# Patient Record
Sex: MALE
[De-identification: names, ages, dates, MRNs, and addresses within clinical notes are randomized per-mention and may not be internally consistent; named-entity substitution may affect disease eponyms.]

---

## 2022-04-18 ENCOUNTER — TRANSCRIPTION ENCOUNTER (OUTPATIENT)
Age: 87
End: 2022-04-18

## 2022-08-09 PROBLEM — Z00.00 ENCOUNTER FOR PREVENTIVE HEALTH EXAMINATION: Status: ACTIVE | Noted: 2022-08-09

## 2022-12-23 ENCOUNTER — NON-APPOINTMENT (OUTPATIENT)
Age: 87
End: 2022-12-23

## 2023-02-11 ENCOUNTER — NON-APPOINTMENT (OUTPATIENT)
Age: 88
End: 2023-02-11

## 2023-07-05 ENCOUNTER — OFFICE (OUTPATIENT)
Dept: URBAN - METROPOLITAN AREA CLINIC 8 | Facility: CLINIC | Age: 88
Setting detail: OPHTHALMOLOGY
End: 2023-07-05
Payer: MEDICARE

## 2023-07-05 DIAGNOSIS — H02.831: ICD-10-CM

## 2023-07-05 DIAGNOSIS — H25.13: ICD-10-CM

## 2023-07-05 DIAGNOSIS — H02.834: ICD-10-CM

## 2023-07-05 PROCEDURE — 92014 COMPRE OPH EXAM EST PT 1/>: CPT | Performed by: OPHTHALMOLOGY

## 2023-07-05 ASSESSMENT — REFRACTION_MANIFEST
OS_VA2: 20/20(J1+)
OS_VA2: 20/20(J1+)
OU_VA: 20/20-2
OS_ADD: +2.75
OD_SPHERE: -2.00
OD_AXIS: 110
OS_AXIS: 075
OS_ADD: +2.50
OD_ADD: +2.75
OD_VA2: 20/20(J1+)
OS_AXIS: 075
OS_CYLINDER: -1.25
OD_CYLINDER: -1.50
OD_CYLINDER: -1.50
OS_SPHERE: -1.50
OS_SPHERE: -1.50
OD_VA1: 20/20-1
OS_VA1: 20/30+
OD_SPHERE: -2.00
OD_VA2: 20/20(J1+)
OD_ADD: +2.50
OS_CYLINDER: -1.25
OD_AXIS: 110
OU_VA: -

## 2023-07-05 ASSESSMENT — REFRACTION_CURRENTRX
OS_VPRISM_DIRECTION: BF
OD_ADD: +2.50
OD_VPRISM_DIRECTION: BF
OD_AXIS: 097
OS_ADD: +2.75
OS_OVR_VA: 20/
OS_VPRISM_DIRECTION: BF
OD_OVR_VA: 20/
OS_SPHERE: -1.50
OD_AXIS: 110
OS_CYLINDER: -1.25
OD_SPHERE: -2.25
OS_AXIS: 060
OD_OVR_VA: 20/
OS_CYLINDER: -1.50
OD_ADD: +2.75
OS_ADD: +2.50
OD_CYLINDER: -1.00
OD_SPHERE: -2.00
OS_SPHERE: -1.50
OS_OVR_VA: 20/
OS_AXIS: 075
OD_CYLINDER: -1.50
OD_VPRISM_DIRECTION: BF

## 2023-07-05 ASSESSMENT — VISUAL ACUITY
OS_BCVA: 20/30-
OD_BCVA: 20/30-

## 2023-07-05 ASSESSMENT — REFRACTION_AUTOREFRACTION
OD_AXIS: 109
OD_SPHERE: -2.00
OS_AXIS: 067
OS_CYLINDER: -2.00
OD_CYLINDER: -1.75
OS_SPHERE: -1.75

## 2023-07-05 ASSESSMENT — SPHEQUIV_DERIVED
OD_SPHEQUIV: -2.75
OD_SPHEQUIV: -2.875
OS_SPHEQUIV: -2.125
OD_SPHEQUIV: -2.75
OS_SPHEQUIV: -2.75
OS_SPHEQUIV: -2.125

## 2023-07-05 ASSESSMENT — AXIALLENGTH_DERIVED
OS_AL: 24.5144
OD_AL: 24.664
OD_AL: 24.7173
OS_AL: 24.2542
OD_AL: 24.664
OS_AL: 24.2542

## 2023-07-05 ASSESSMENT — LID POSITION - DERMATOCHALASIS
OS_DERMATOCHALASIS: 2+
OD_DERMATOCHALASIS: 2+

## 2023-07-05 ASSESSMENT — KERATOMETRY
METHOD_AUTO_MANUAL: AUTO
OS_K1POWER_DIOPTERS: 43.50
OS_AXISANGLE_DEGREES: 156
OD_K1POWER_DIOPTERS: 43.25
OD_K2POWER_DIOPTERS: 44.00
OD_AXISANGLE_DEGREES: 014
OS_K2POWER_DIOPTERS: 44.50

## 2023-07-05 ASSESSMENT — CONFRONTATIONAL VISUAL FIELD TEST (CVF)
OS_FINDINGS: FULL
OD_FINDINGS: FULL

## 2023-09-11 ENCOUNTER — OFFICE (OUTPATIENT)
Dept: URBAN - METROPOLITAN AREA CLINIC 8 | Facility: CLINIC | Age: 88
Setting detail: OPHTHALMOLOGY
End: 2023-09-11
Payer: MEDICARE

## 2023-09-11 DIAGNOSIS — H25.13: ICD-10-CM

## 2023-09-11 DIAGNOSIS — H25.11: ICD-10-CM

## 2023-09-11 PROCEDURE — 92136 OPHTHALMIC BIOMETRY: CPT | Performed by: OPHTHALMOLOGY

## 2023-09-11 PROCEDURE — 99213 OFFICE O/P EST LOW 20 MIN: CPT | Performed by: OPHTHALMOLOGY

## 2023-09-11 ASSESSMENT — REFRACTION_MANIFEST
OD_SPHERE: -2.00
OS_ADD: +2.75
OD_VA2: 20/20(J1+)
OS_CYLINDER: -1.25
OS_SPHERE: -1.50
OD_VA1: 20/20-1
OD_CYLINDER: -1.50
OS_VA1: 20/30+
OU_VA: 20/20-2
OS_AXIS: 075
OS_SPHERE: -1.50
OD_AXIS: 110
OD_SPHERE: -2.00
OS_AXIS: 075
OD_AXIS: 110
OS_CYLINDER: -1.25
OS_VA2: 20/20(J1+)
OS_ADD: +2.50
OU_VA: -
OD_ADD: +2.50
OS_VA2: 20/20(J1+)
OD_CYLINDER: -1.50
OD_VA2: 20/20(J1+)
OD_ADD: +2.75

## 2023-09-11 ASSESSMENT — SPHEQUIV_DERIVED
OD_SPHEQUIV: -2.75
OS_SPHEQUIV: -2.75
OD_SPHEQUIV: -2.75
OS_SPHEQUIV: -2.125
OS_SPHEQUIV: -2.125
OD_SPHEQUIV: -2.875

## 2023-09-11 ASSESSMENT — REFRACTION_CURRENTRX
OD_VPRISM_DIRECTION: BF
OD_SPHERE: -2.25
OD_CYLINDER: -1.50
OS_AXIS: 060
OS_SPHERE: -1.50
OS_OVR_VA: 20/
OD_ADD: +2.50
OD_CYLINDER: -1.00
OS_SPHERE: -1.50
OD_SPHERE: -2.00
OS_CYLINDER: -1.50
OD_OVR_VA: 20/
OD_OVR_VA: 20/
OD_ADD: +2.75
OS_ADD: +2.75
OS_VPRISM_DIRECTION: BF
OS_ADD: +2.50
OS_OVR_VA: 20/
OD_AXIS: 110
OS_CYLINDER: -1.25
OS_AXIS: 075
OS_VPRISM_DIRECTION: BF
OD_VPRISM_DIRECTION: BF
OD_AXIS: 097

## 2023-09-11 ASSESSMENT — REFRACTION_AUTOREFRACTION
OS_SPHERE: -1.75
OD_AXIS: 109
OD_SPHERE: -2.00
OD_CYLINDER: -1.75
OS_CYLINDER: -2.00
OS_AXIS: 067

## 2023-09-11 ASSESSMENT — AXIALLENGTH_DERIVED
OD_AL: 24.664
OS_AL: 24.2542
OS_AL: 24.2542
OD_AL: 24.7173
OS_AL: 24.5144
OD_AL: 24.664

## 2023-09-11 ASSESSMENT — KERATOMETRY
OS_K1POWER_DIOPTERS: 43.50
OS_AXISANGLE_DEGREES: 156
OS_K2POWER_DIOPTERS: 44.50
METHOD_AUTO_MANUAL: AUTO
OD_AXISANGLE_DEGREES: 014
OD_K1POWER_DIOPTERS: 43.25
OD_K2POWER_DIOPTERS: 44.00

## 2023-09-11 ASSESSMENT — VISUAL ACUITY
OS_BCVA: 20/30-2
OD_BCVA: 20/30-

## 2023-09-11 ASSESSMENT — LID POSITION - DERMATOCHALASIS
OD_DERMATOCHALASIS: 2+
OS_DERMATOCHALASIS: 2+

## 2023-09-11 ASSESSMENT — CONFRONTATIONAL VISUAL FIELD TEST (CVF)
OS_FINDINGS: FULL
OD_FINDINGS: FULL

## 2023-10-03 ENCOUNTER — OUTPATIENT HOSPITAL (OUTPATIENT)
Dept: URBAN - METROPOLITAN AREA CLINIC 7 | Facility: CLINIC | Age: 88
Setting detail: OPHTHALMOLOGY
End: 2023-10-03
Payer: MEDICARE

## 2023-10-03 DIAGNOSIS — H52.211: ICD-10-CM

## 2023-10-03 DIAGNOSIS — H25.11: ICD-10-CM

## 2023-10-03 PROCEDURE — V2787 ASTIGMATISM-CORRECT FUNCTION: HCPCS | Performed by: OPHTHALMOLOGY

## 2023-10-03 PROCEDURE — 66984 XCAPSL CTRC RMVL W/O ECP: CPT | Mod: RT | Performed by: OPHTHALMOLOGY

## 2023-10-04 ENCOUNTER — OFFICE (OUTPATIENT)
Dept: URBAN - METROPOLITAN AREA CLINIC 8 | Facility: CLINIC | Age: 88
Setting detail: OPHTHALMOLOGY
End: 2023-10-04
Payer: MEDICARE

## 2023-10-04 ENCOUNTER — RX ONLY (RX ONLY)
Age: 88
End: 2023-10-04

## 2023-10-04 DIAGNOSIS — H25.12: ICD-10-CM

## 2023-10-04 PROCEDURE — 92136 OPHTHALMIC BIOMETRY: CPT | Mod: LT | Performed by: OPHTHALMOLOGY

## 2023-10-04 ASSESSMENT — REFRACTION_MANIFEST
OD_VA2: 20/20(J1+)
OS_AXIS: 075
OS_VA1: 20/30+
OD_VA2: 20/20(J1+)
OD_AXIS: 110
OS_CYLINDER: -1.25
OS_VA2: 20/20(J1+)
OD_ADD: +2.50
OU_VA: -
OS_CYLINDER: -1.25
OD_SPHERE: -2.00
OS_SPHERE: -1.50
OS_SPHERE: -1.50
OS_AXIS: 075
OD_ADD: +2.75
OD_AXIS: 110
OD_CYLINDER: -1.50
OU_VA: 20/20-2
OS_ADD: +2.50
OS_VA2: 20/20(J1+)
OD_VA1: 20/20-1
OD_CYLINDER: -1.50
OD_SPHERE: -2.00
OS_ADD: +2.75

## 2023-10-04 ASSESSMENT — AXIALLENGTH_DERIVED
OD_AL: 24.7648
OS_AL: 24.3088
OS_AL: 24.2058
OD_AL: 23.5891
OS_AL: 24.2058
OD_AL: 24.7648

## 2023-10-04 ASSESSMENT — CONFRONTATIONAL VISUAL FIELD TEST (CVF)
OS_FINDINGS: FULL
OD_FINDINGS: FULL

## 2023-10-04 ASSESSMENT — KERATOMETRY
OS_K1POWER_DIOPTERS: 43.75
OS_AXISANGLE_DEGREES: 158
OS_K2POWER_DIOPTERS: 44.50
OD_K2POWER_DIOPTERS: 43.50
OD_K1POWER_DIOPTERS: 43.25
OD_AXISANGLE_DEGREES: 040
METHOD_AUTO_MANUAL: AUTO

## 2023-10-04 ASSESSMENT — REFRACTION_CURRENTRX
OD_CYLINDER: -1.00
OS_VPRISM_DIRECTION: BF
OD_OVR_VA: 20/
OS_SPHERE: -1.50
OS_VPRISM_DIRECTION: BF
OD_SPHERE: -2.25
OD_VPRISM_DIRECTION: BF
OD_ADD: +2.50
OS_AXIS: 060
OD_AXIS: 097
OS_OVR_VA: 20/
OD_ADD: +2.75
OS_CYLINDER: -1.50
OS_ADD: +2.75
OS_OVR_VA: 20/
OD_CYLINDER: -1.50
OD_VPRISM_DIRECTION: BF
OS_ADD: +2.50
OD_SPHERE: -2.00
OS_SPHERE: -1.50
OS_CYLINDER: -1.25
OS_AXIS: 075
OD_AXIS: 110
OD_OVR_VA: 20/

## 2023-10-04 ASSESSMENT — LID POSITION - DERMATOCHALASIS
OD_DERMATOCHALASIS: 2+
OS_DERMATOCHALASIS: 2+

## 2023-10-04 ASSESSMENT — REFRACTION_AUTOREFRACTION
OS_SPHERE: -1.00
OD_CYLINDER: -0.25
OD_SPHERE: +0.25
OS_AXIS: 073
OS_CYLINDER: -2.75
OD_AXIS: 010

## 2023-10-04 ASSESSMENT — VISUAL ACUITY
OD_BCVA: 20/30-
OS_BCVA: 20/40

## 2023-10-04 ASSESSMENT — SPHEQUIV_DERIVED
OD_SPHEQUIV: -2.75
OD_SPHEQUIV: -2.75
OD_SPHEQUIV: 0.125
OS_SPHEQUIV: -2.375
OS_SPHEQUIV: -2.125
OS_SPHEQUIV: -2.125

## 2023-10-04 ASSESSMENT — TONOMETRY: OS_IOP_MMHG: 17

## 2023-10-17 ENCOUNTER — OUTPATIENT HOSPITAL (OUTPATIENT)
Dept: URBAN - METROPOLITAN AREA CLINIC 7 | Facility: CLINIC | Age: 88
Setting detail: OPHTHALMOLOGY
End: 2023-10-17
Payer: MEDICARE

## 2023-10-17 DIAGNOSIS — H25.12: ICD-10-CM

## 2023-10-17 DIAGNOSIS — H52.212: ICD-10-CM

## 2023-10-17 PROCEDURE — V2787 ASTIGMATISM-CORRECT FUNCTION: HCPCS | Performed by: OPHTHALMOLOGY

## 2023-10-17 PROCEDURE — 66984 XCAPSL CTRC RMVL W/O ECP: CPT | Mod: 79,LT | Performed by: OPHTHALMOLOGY

## 2023-10-17 PROCEDURE — FEMTO FEMTOSECOND LASER: Mod: GY | Performed by: OPHTHALMOLOGY

## 2023-10-18 ENCOUNTER — OFFICE (OUTPATIENT)
Dept: URBAN - METROPOLITAN AREA CLINIC 8 | Facility: CLINIC | Age: 88
Setting detail: OPHTHALMOLOGY
End: 2023-10-18
Payer: MEDICARE

## 2023-10-18 DIAGNOSIS — Z96.1: ICD-10-CM

## 2023-10-18 PROBLEM — H11.32 SUB-CONJUNCTIVAL HEMORRHAGE; LEFT EYE: Status: ACTIVE | Noted: 2023-10-18

## 2023-10-18 PROCEDURE — 99024 POSTOP FOLLOW-UP VISIT: CPT | Performed by: OPHTHALMOLOGY

## 2023-10-18 ASSESSMENT — LID POSITION - DERMATOCHALASIS
OD_DERMATOCHALASIS: 2+
OS_DERMATOCHALASIS: 2+

## 2023-10-18 ASSESSMENT — REFRACTION_MANIFEST
OD_ADD: +2.50
OD_VA1: 20/20-1
OS_ADD: +2.50
OU_VA: 20/20-2
OS_SPHERE: -1.50
OS_ADD: +2.75
OD_ADD: +2.75
OS_CYLINDER: -1.25
OS_VA2: 20/20(J1+)
OD_VA2: 20/20(J1+)
OS_SPHERE: -1.50
OD_SPHERE: -2.00
OD_CYLINDER: -1.50
OS_VA1: 20/30+
OU_VA: -
OS_AXIS: 075
OD_AXIS: 110
OD_VA2: 20/20(J1+)
OS_CYLINDER: -1.25
OS_AXIS: 075
OD_CYLINDER: -1.50
OD_SPHERE: -2.00
OS_VA2: 20/20(J1+)
OD_AXIS: 110

## 2023-10-18 ASSESSMENT — SPHEQUIV_DERIVED
OD_SPHEQUIV: -2.75
OS_SPHEQUIV: -2.125
OS_SPHEQUIV: -0.25
OD_SPHEQUIV: -2.75
OS_SPHEQUIV: -2.125

## 2023-10-18 ASSESSMENT — REFRACTION_CURRENTRX
OS_CYLINDER: -1.50
OD_AXIS: 097
OS_OVR_VA: 20/
OD_CYLINDER: -1.50
OS_SPHERE: -1.50
OS_AXIS: 075
OD_SPHERE: -2.25
OD_ADD: +2.50
OS_CYLINDER: -1.25
OD_ADD: +2.75
OD_VPRISM_DIRECTION: BF
OD_CYLINDER: -1.00
OS_ADD: +2.75
OD_VPRISM_DIRECTION: BF
OS_VPRISM_DIRECTION: BF
OS_ADD: +2.50
OS_SPHERE: -1.50
OS_OVR_VA: 20/
OS_VPRISM_DIRECTION: BF
OD_SPHERE: -2.00
OD_OVR_VA: 20/
OD_OVR_VA: 20/
OD_AXIS: 110
OS_AXIS: 060

## 2023-10-18 ASSESSMENT — VISUAL ACUITY
OS_BCVA: 20/40
OD_BCVA: 20/40+2

## 2023-10-18 ASSESSMENT — AXIALLENGTH_DERIVED
OS_AL: 24.3029
OD_AL: 24.6139
OD_AL: 24.6139
OS_AL: 24.3029
OS_AL: 23.5516

## 2023-10-18 ASSESSMENT — KERATOMETRY
OD_AXISANGLE_DEGREES: 021
OS_K1POWER_DIOPTERS: 43.50
METHOD_AUTO_MANUAL: AUTO
OS_K2POWER_DIOPTERS: 44.25
OS_AXISANGLE_DEGREES: 142
OD_K1POWER_DIOPTERS: 43.50
OD_K2POWER_DIOPTERS: 44.00

## 2023-10-18 ASSESSMENT — REFRACTION_AUTOREFRACTION
OD_SPHERE: PLANO
OD_AXIS: 103
OS_CYLINDER: -1.00
OD_CYLINDER: -0.25
OS_SPHERE: +0.25
OS_AXIS: 155

## 2023-10-18 ASSESSMENT — CONFRONTATIONAL VISUAL FIELD TEST (CVF)
OD_FINDINGS: FULL
OS_FINDINGS: FULL

## 2023-10-18 ASSESSMENT — TONOMETRY: OD_IOP_MMHG: 11

## 2023-10-18 ASSESSMENT — CORNEAL EDEMA CLINICAL DESCRIPTION: OS_CORNEALEDEMA: T @ INCISION

## 2023-11-13 ENCOUNTER — OFFICE (OUTPATIENT)
Dept: URBAN - METROPOLITAN AREA CLINIC 8 | Facility: CLINIC | Age: 88
Setting detail: OPHTHALMOLOGY
End: 2023-11-13
Payer: MEDICARE

## 2023-11-13 DIAGNOSIS — Z96.1: ICD-10-CM

## 2023-11-13 PROCEDURE — 99024 POSTOP FOLLOW-UP VISIT: CPT | Performed by: OPHTHALMOLOGY

## 2023-11-13 ASSESSMENT — REFRACTION_MANIFEST
OS_AXIS: 080
OD_VA2: 20/20(J1+)
OS_VA2: 20/20(J1+)
OS_VA2: 20/20(J1+)
OD_AXIS: 105
OU_VA: -
OD_SPHERE: +0.25
OS_SPHERE: -0.50
OS_CYLINDER: -0.25
OD_CYLINDER: -0.50
OD_ADD: +2.75
OS_CYLINDER: -1.25
OS_SPHERE: -1.50
OD_CYLINDER: -1.50
OS_VA1: 20/30+
OS_AXIS: 075
OS_ADD: +1.75
OU_VA: 20/20-2
OD_VA1: 20/20-1
OD_SPHERE: -2.00
OD_VA2: 20/20(J1+)
OD_AXIS: 110
OD_ADD: +1.75
OS_ADD: +2.75

## 2023-11-13 ASSESSMENT — REFRACTION_CURRENTRX
OS_VPRISM_DIRECTION: BF
OD_OVR_VA: 20/
OD_AXIS: 110
OD_ADD: +2.50
OD_CYLINDER: -1.50
OD_ADD: +2.75
OD_CYLINDER: -1.00
OS_CYLINDER: -1.50
OS_VPRISM_DIRECTION: BF
OS_SPHERE: -1.50
OS_AXIS: 060
OD_VPRISM_DIRECTION: BF
OS_SPHERE: -1.50
OD_SPHERE: -2.25
OD_VPRISM_DIRECTION: BF
OS_ADD: +2.50
OS_OVR_VA: 20/
OS_AXIS: 075
OD_SPHERE: -2.00
OD_AXIS: 097
OS_OVR_VA: 20/
OS_ADD: +2.75
OD_OVR_VA: 20/
OS_CYLINDER: -1.25

## 2023-11-13 ASSESSMENT — CONFRONTATIONAL VISUAL FIELD TEST (CVF)
OS_FINDINGS: FULL
OD_FINDINGS: FULL

## 2023-11-13 ASSESSMENT — REFRACTION_AUTOREFRACTION
OD_AXIS: 106
OS_CYLINDER: -0.50
OD_SPHERE: +0.50
OS_SPHERE: -0.50
OD_CYLINDER: -0.50
OS_AXIS: 080

## 2023-11-13 ASSESSMENT — SPHEQUIV_DERIVED
OD_SPHEQUIV: 0
OD_SPHEQUIV: 0.25
OS_SPHEQUIV: -0.75
OS_SPHEQUIV: -2.125
OD_SPHEQUIV: -2.75
OS_SPHEQUIV: -0.625

## 2023-11-13 ASSESSMENT — LID POSITION - DERMATOCHALASIS
OS_DERMATOCHALASIS: 2+
OD_DERMATOCHALASIS: 2+

## 2023-12-23 ENCOUNTER — NON-APPOINTMENT (OUTPATIENT)
Age: 88
End: 2023-12-23

## 2024-05-20 ENCOUNTER — OFFICE (OUTPATIENT)
Dept: URBAN - METROPOLITAN AREA CLINIC 8 | Facility: CLINIC | Age: 89
Setting detail: OPHTHALMOLOGY
End: 2024-05-20
Payer: MEDICARE

## 2024-05-20 DIAGNOSIS — Z96.1: ICD-10-CM

## 2024-05-20 DIAGNOSIS — H02.834: ICD-10-CM

## 2024-05-20 DIAGNOSIS — H02.831: ICD-10-CM

## 2024-05-20 DIAGNOSIS — H04.123: ICD-10-CM

## 2024-05-20 PROBLEM — H02.135 ECTROPION-SENILE; RIGHT LOWER LID, LEFT LOWER LID: Status: ACTIVE | Noted: 2024-05-20

## 2024-05-20 PROBLEM — H02.132 ECTROPION-SENILE; RIGHT LOWER LID, LEFT LOWER LID: Status: ACTIVE | Noted: 2024-05-20

## 2024-05-20 PROCEDURE — 92014 COMPRE OPH EXAM EST PT 1/>: CPT | Performed by: OPHTHALMOLOGY

## 2024-05-20 ASSESSMENT — LID POSITION - DERMATOCHALASIS
OD_DERMATOCHALASIS: 2+
OS_DERMATOCHALASIS: 2+

## 2024-05-20 ASSESSMENT — LID POSITION - ECTROPION
OS_ECTROPION: LLL
OD_ECTROPION: RLL

## 2024-05-20 ASSESSMENT — CONFRONTATIONAL VISUAL FIELD TEST (CVF)
OD_FINDINGS: FULL
OS_FINDINGS: FULL

## 2024-06-27 ENCOUNTER — APPOINTMENT (OUTPATIENT)
Dept: CARDIOLOGY | Facility: CLINIC | Age: 89
End: 2024-06-27
Payer: MEDICARE

## 2024-06-27 VITALS
HEIGHT: 68.5 IN | DIASTOLIC BLOOD PRESSURE: 64 MMHG | RESPIRATION RATE: 14 BRPM | OXYGEN SATURATION: 98 % | SYSTOLIC BLOOD PRESSURE: 124 MMHG | WEIGHT: 170 LBS | BODY MASS INDEX: 25.47 KG/M2 | HEART RATE: 55 BPM

## 2024-06-27 VITALS — SYSTOLIC BLOOD PRESSURE: 128 MMHG | DIASTOLIC BLOOD PRESSURE: 66 MMHG

## 2024-06-27 DIAGNOSIS — Z85.828 PERSONAL HISTORY OF OTHER MALIGNANT NEOPLASM OF SKIN: ICD-10-CM

## 2024-06-27 DIAGNOSIS — C44.92 SQUAMOUS CELL CARCINOMA OF SKIN, UNSPECIFIED: ICD-10-CM

## 2024-06-27 DIAGNOSIS — F17.290 NICOTINE DEPENDENCE, OTHER TOBACCO PRODUCT, UNCOMPLICATED: ICD-10-CM

## 2024-06-27 DIAGNOSIS — I44.7 LEFT BUNDLE-BRANCH BLOCK, UNSPECIFIED: ICD-10-CM

## 2024-06-27 DIAGNOSIS — Z78.9 OTHER SPECIFIED HEALTH STATUS: ICD-10-CM

## 2024-06-27 DIAGNOSIS — I44.0 ATRIOVENTRICULAR BLOCK, FIRST DEGREE: ICD-10-CM

## 2024-06-27 DIAGNOSIS — R01.1 CARDIAC MURMUR, UNSPECIFIED: ICD-10-CM

## 2024-06-27 DIAGNOSIS — Z01.818 ENCOUNTER FOR OTHER PREPROCEDURAL EXAMINATION: ICD-10-CM

## 2024-06-27 DIAGNOSIS — E78.5 HYPERLIPIDEMIA, UNSPECIFIED: ICD-10-CM

## 2024-06-27 PROCEDURE — G2211 COMPLEX E/M VISIT ADD ON: CPT

## 2024-06-27 PROCEDURE — 93306 TTE W/DOPPLER COMPLETE: CPT

## 2024-06-27 PROCEDURE — 99204 OFFICE O/P NEW MOD 45 MIN: CPT

## 2024-07-17 ENCOUNTER — APPOINTMENT (OUTPATIENT)
Dept: CARDIOLOGY | Facility: CLINIC | Age: 89
End: 2024-07-17

## 2024-07-26 ENCOUNTER — APPOINTMENT (OUTPATIENT)
Dept: CARDIOLOGY | Facility: CLINIC | Age: 89
End: 2024-07-26

## 2024-09-17 ENCOUNTER — APPOINTMENT (OUTPATIENT)
Dept: CARDIOLOGY | Facility: CLINIC | Age: 89
End: 2024-09-17

## 2024-09-17 PROCEDURE — 93242 EXT ECG>48HR<7D RECORDING: CPT

## 2024-09-30 PROCEDURE — 93244 EXT ECG>48HR<7D REV&INTERPJ: CPT

## 2024-10-03 ENCOUNTER — APPOINTMENT (OUTPATIENT)
Dept: CARDIOLOGY | Facility: CLINIC | Age: 89
End: 2024-10-03
Payer: MEDICARE

## 2024-10-03 DIAGNOSIS — I44.0 ATRIOVENTRICULAR BLOCK, FIRST DEGREE: ICD-10-CM

## 2024-10-03 DIAGNOSIS — Z01.818 ENCOUNTER FOR OTHER PREPROCEDURAL EXAMINATION: ICD-10-CM

## 2024-10-03 DIAGNOSIS — R01.1 CARDIAC MURMUR, UNSPECIFIED: ICD-10-CM

## 2024-10-03 DIAGNOSIS — E78.5 HYPERLIPIDEMIA, UNSPECIFIED: ICD-10-CM

## 2024-10-03 DIAGNOSIS — C44.90 UNSPECIFIED MALIGNANT NEOPLASM OF SKIN, UNSPECIFIED: ICD-10-CM

## 2024-10-03 DIAGNOSIS — I44.7 LEFT BUNDLE-BRANCH BLOCK, UNSPECIFIED: ICD-10-CM

## 2024-10-03 PROCEDURE — 99214 OFFICE O/P EST MOD 30 MIN: CPT

## 2024-10-03 PROCEDURE — G2211 COMPLEX E/M VISIT ADD ON: CPT

## 2024-10-03 NOTE — DISCUSSION/SUMMARY
[FreeTextEntry1] : PRINCESS MARSHALL is a 89 year old M who presents today Oct 03, 2024 with the above history and the following active issues:   HLD: No fasting lipid profile available for my review. We will attempt to obtain this from Dr. Moran's office. Continue Simvastatin.   Heart murmur, LAFB, 1st degree AV block: Holter monitoring showed 13 SVT episodes, 1.9% APCs, pt is asymptomatic for this with no sensation of palpitations, fast heartbeat or fluttering. He denies all CV sx.  Avg HR 66bpm, last in office HR 55bpm, the addition of beta blocker poses risk of symptomatic bradycardia. Given lack of sx, we will hold off on Rx at this time.   OV in 6 months or sooner PRN.  Limitations of non-invasive testing reviewed. Red flag symptoms which would warrant sooner emergent evaluation reviewed with the patient. All questions and concerns were addressed and answered.  He will make us aware of any new sx or concerns.   Sincerely,   Erendira Quintanilla, [facility] Patient's history, testing, and plan reviewed with supervising MD: Dr. Nick Hicks  no risks/benefits discussed with patient

## 2024-10-03 NOTE — HISTORY OF PRESENT ILLNESS
[FreeTextEntry1] : Jaleel is a very pleasant 89yoM with PMHx: HLD, 1st degree AV block, LAFB and heart murmur.   He is remarkably active and still an active [employer] [place]. He exercises daily with weights up to 60lbs, walks and stair climbs without difficulty.  There is no exertional chest pain, pressure or discomfort. There is no significant dyspnea on exertion or orthopnea. There are no symptomatic palpitations, lightheadedness, dizziness or syncope.   He recently had COVID, fevers as high as 100.7 and congestion. Sx are improving, he saw his PCP for this. He feels almost back to his baseline. Exercised today and felt good while doing so.   We reviewed the following testing:  Labs: Na 139, K 4.4, Cr 1.1.  TTE: EF 60-65%, mild LVH, mild AS, mild AR, mild MR, mild TR, PASP 24mmHg.  Zio: Sinus - asymptomatic - avg HR 66bpm. 1.9% APCs, 13 SVT episodes. <1% PVCs with ventricular bigeminy and trigeminy noted. These results were reviewed with him and his wife in detail.

## 2025-06-09 ENCOUNTER — OFFICE (OUTPATIENT)
Dept: URBAN - METROPOLITAN AREA CLINIC 8 | Facility: CLINIC | Age: OVER 89
Setting detail: OPHTHALMOLOGY
End: 2025-06-09
Payer: MEDICARE

## 2025-06-09 DIAGNOSIS — H02.132: ICD-10-CM

## 2025-06-09 DIAGNOSIS — H04.123: ICD-10-CM

## 2025-06-09 DIAGNOSIS — H26.493: ICD-10-CM

## 2025-06-09 DIAGNOSIS — H02.135: ICD-10-CM

## 2025-06-09 PROCEDURE — 92014 COMPRE OPH EXAM EST PT 1/>: CPT | Performed by: OPHTHALMOLOGY

## 2025-06-09 ASSESSMENT — REFRACTION_CURRENTRX
OD_CYLINDER: -1.00
OD_ADD: +2.50
OD_CYLINDER: -1.50
OD_AXIS: 110
OS_ADD: +2.75
OD_AXIS: 097
OD_OVR_VA: 20/
OS_VPRISM_DIRECTION: BF
OS_SPHERE: -1.50
OD_OVR_VA: 20/
OS_SPHERE: -1.50
OD_VPRISM_DIRECTION: BF
OS_OVR_VA: 20/
OS_AXIS: 075
OS_VPRISM_DIRECTION: BF
OS_OVR_VA: 20/
OS_ADD: +2.50
OD_SPHERE: -2.25
OS_AXIS: 060
OD_ADD: +2.75
OS_CYLINDER: -1.50
OS_CYLINDER: -1.25
OD_SPHERE: -2.00
OD_VPRISM_DIRECTION: BF

## 2025-06-09 ASSESSMENT — REFRACTION_MANIFEST
OD_SPHERE: +0.25
OD_VA1: 20/25+2
OD_CYLINDER: -0.50
OS_CYLINDER: -0.50
OD_CYLINDER: -0.25
OS_SPHERE: PLANO
OS_CYLINDER: -0.50
OS_AXIS: 130
OD_VA2: 20/20(J1+)
OS_SPHERE: PLANO
OD_VA2: 20/20(J1+)
OS_AXIS: 155
OU_VA: 20/20-2
OS_ADD: +2.50
OD_SPHERE: +0.50
OU_VA: 20/15-2
OS_VA2: 20/20(J1+)
OS_VA2: 20/20(J1+)
OD_AXIS: 110
OS_VA1: 20/25
OD_ADD: +2.50
OD_AXIS: 135
OS_ADD: +2.50
OD_VA1: 20/20+2
OD_ADD: +2.50

## 2025-06-09 ASSESSMENT — KERATOMETRY
OS_K2POWER_DIOPTERS: 44.00
OD_K2POWER_DIOPTERS: 44.00
OD_K1POWER_DIOPTERS: 42.75
OS_AXISANGLE_DEGREES: 152
METHOD_AUTO_MANUAL: AUTO
OS_K1POWER_DIOPTERS: 43.00
OD_AXISANGLE_DEGREES: 022

## 2025-06-09 ASSESSMENT — VISUAL ACUITY
OD_BCVA: 20/25-2
OS_BCVA: 20/25

## 2025-06-09 ASSESSMENT — CONFRONTATIONAL VISUAL FIELD TEST (CVF)
OS_FINDINGS: FULL
OD_FINDINGS: FULL

## 2025-06-09 ASSESSMENT — REFRACTION_AUTOREFRACTION
OD_SPHERE: +0.75
OD_AXIS: 110
OS_AXIS: 132
OS_SPHERE: +0.25
OD_CYLINDER: -1.00
OS_CYLINDER: -0.50

## 2025-06-09 ASSESSMENT — LID POSITION - ECTROPION
OS_ECTROPION: LLL
OD_ECTROPION: RLL

## 2025-06-09 ASSESSMENT — LID POSITION - DERMATOCHALASIS
OD_DERMATOCHALASIS: 2+
OS_DERMATOCHALASIS: 2+